# Patient Record
Sex: FEMALE | Race: WHITE | NOT HISPANIC OR LATINO
[De-identification: names, ages, dates, MRNs, and addresses within clinical notes are randomized per-mention and may not be internally consistent; named-entity substitution may affect disease eponyms.]

---

## 2017-03-06 ENCOUNTER — MEDICATION RENEWAL (OUTPATIENT)
Age: 62
End: 2017-03-06

## 2017-03-30 ENCOUNTER — APPOINTMENT (OUTPATIENT)
Dept: ENDOCRINOLOGY | Facility: CLINIC | Age: 62
End: 2017-03-30

## 2017-03-30 VITALS
WEIGHT: 99 LBS | HEIGHT: 58.5 IN | SYSTOLIC BLOOD PRESSURE: 120 MMHG | BODY MASS INDEX: 20.23 KG/M2 | HEART RATE: 90 BPM | DIASTOLIC BLOOD PRESSURE: 78 MMHG | OXYGEN SATURATION: 99 %

## 2017-03-30 RX ORDER — DENOSUMAB 60 MG/ML
60 INJECTION SUBCUTANEOUS
Qty: 1 | Refills: 0 | Status: COMPLETED | OUTPATIENT
Start: 2017-03-30

## 2017-03-30 RX ADMIN — DENOSUMAB 0 MG/ML: 60 INJECTION SUBCUTANEOUS at 00:00

## 2017-10-03 ENCOUNTER — APPOINTMENT (OUTPATIENT)
Dept: ENDOCRINOLOGY | Facility: CLINIC | Age: 62
End: 2017-10-03
Payer: COMMERCIAL

## 2017-10-03 VITALS
OXYGEN SATURATION: 98 % | SYSTOLIC BLOOD PRESSURE: 110 MMHG | HEART RATE: 80 BPM | HEIGHT: 58.5 IN | DIASTOLIC BLOOD PRESSURE: 60 MMHG | BODY MASS INDEX: 19.61 KG/M2 | WEIGHT: 96 LBS

## 2017-10-03 PROCEDURE — 99214 OFFICE O/P EST MOD 30 MIN: CPT | Mod: 25

## 2017-10-03 PROCEDURE — 96372 THER/PROPH/DIAG INJ SC/IM: CPT

## 2017-10-03 RX ORDER — DENOSUMAB 60 MG/ML
60 INJECTION SUBCUTANEOUS
Qty: 0 | Refills: 0 | Status: COMPLETED | OUTPATIENT
Start: 2017-10-03

## 2017-10-03 RX ADMIN — DENOSUMAB 0 MG/ML: 60 INJECTION SUBCUTANEOUS at 00:00

## 2018-02-26 ENCOUNTER — MEDICATION RENEWAL (OUTPATIENT)
Age: 63
End: 2018-02-26

## 2018-04-10 ENCOUNTER — APPOINTMENT (OUTPATIENT)
Dept: ENDOCRINOLOGY | Facility: CLINIC | Age: 63
End: 2018-04-10
Payer: COMMERCIAL

## 2018-04-10 VITALS
BODY MASS INDEX: 19.15 KG/M2 | WEIGHT: 95 LBS | DIASTOLIC BLOOD PRESSURE: 80 MMHG | HEART RATE: 70 BPM | OXYGEN SATURATION: 98 % | SYSTOLIC BLOOD PRESSURE: 120 MMHG | HEIGHT: 59 IN

## 2018-04-10 PROCEDURE — 96372 THER/PROPH/DIAG INJ SC/IM: CPT

## 2018-04-10 PROCEDURE — 99214 OFFICE O/P EST MOD 30 MIN: CPT | Mod: 25

## 2018-04-10 RX ORDER — DENOSUMAB 60 MG/ML
60 INJECTION SUBCUTANEOUS
Qty: 0 | Refills: 0 | Status: COMPLETED | OUTPATIENT
Start: 2018-04-10

## 2018-04-10 RX ADMIN — DENOSUMAB 0 MG/ML: 60 INJECTION SUBCUTANEOUS at 00:00

## 2018-04-11 LAB
ALBUMIN SERPL ELPH-MCNC: 3.9 G/DL
ALP BLD-CCNC: 37 U/L
ALT SERPL-CCNC: 10 U/L
ANION GAP SERPL CALC-SCNC: 14 MMOL/L
AST SERPL-CCNC: 14 U/L
BILIRUB SERPL-MCNC: 0.2 MG/DL
BUN SERPL-MCNC: 19 MG/DL
CALCIUM SERPL-MCNC: 9.9 MG/DL
CHLORIDE SERPL-SCNC: 102 MMOL/L
CO2 SERPL-SCNC: 26 MMOL/L
CREAT SERPL-MCNC: 0.85 MG/DL
GLUCOSE SERPL-MCNC: 84 MG/DL
POTASSIUM SERPL-SCNC: 4.8 MMOL/L
PROT SERPL-MCNC: 6.6 G/DL
SODIUM SERPL-SCNC: 142 MMOL/L

## 2018-10-11 ENCOUNTER — APPOINTMENT (OUTPATIENT)
Dept: ENDOCRINOLOGY | Facility: CLINIC | Age: 63
End: 2018-10-11
Payer: COMMERCIAL

## 2018-10-11 VITALS
HEART RATE: 82 BPM | HEIGHT: 58.5 IN | OXYGEN SATURATION: 96 % | SYSTOLIC BLOOD PRESSURE: 110 MMHG | DIASTOLIC BLOOD PRESSURE: 80 MMHG | BODY MASS INDEX: 19.61 KG/M2 | WEIGHT: 96 LBS

## 2018-10-11 VITALS — HEIGHT: 58.5 IN | BODY MASS INDEX: 19.61 KG/M2 | WEIGHT: 96 LBS

## 2018-10-11 PROCEDURE — 96372 THER/PROPH/DIAG INJ SC/IM: CPT

## 2018-10-11 PROCEDURE — ZZZZZ: CPT

## 2018-10-11 PROCEDURE — 77080 DXA BONE DENSITY AXIAL: CPT

## 2018-10-11 PROCEDURE — 99214 OFFICE O/P EST MOD 30 MIN: CPT | Mod: 25

## 2018-10-11 RX ORDER — DENOSUMAB 60 MG/ML
60 INJECTION SUBCUTANEOUS
Qty: 0 | Refills: 0 | Status: COMPLETED | OUTPATIENT
Start: 2018-10-11

## 2018-10-11 RX ADMIN — DENOSUMAB 0 MG/ML: 60 INJECTION SUBCUTANEOUS at 00:00

## 2018-10-25 ENCOUNTER — TRANSCRIPTION ENCOUNTER (OUTPATIENT)
Age: 63
End: 2018-10-25

## 2019-04-16 ENCOUNTER — APPOINTMENT (OUTPATIENT)
Dept: ENDOCRINOLOGY | Facility: CLINIC | Age: 64
End: 2019-04-16
Payer: COMMERCIAL

## 2019-04-16 VITALS
BODY MASS INDEX: 19.61 KG/M2 | HEIGHT: 58.5 IN | HEART RATE: 81 BPM | OXYGEN SATURATION: 98 % | SYSTOLIC BLOOD PRESSURE: 118 MMHG | DIASTOLIC BLOOD PRESSURE: 68 MMHG | WEIGHT: 96 LBS

## 2019-04-16 PROCEDURE — 99214 OFFICE O/P EST MOD 30 MIN: CPT | Mod: 25

## 2019-04-16 PROCEDURE — 96401 CHEMO ANTI-NEOPL SQ/IM: CPT

## 2019-04-16 NOTE — HISTORY OF PRESENT ILLNESS
[Risedronate (Actonel)] : Risedronate [Patient taking Meds Correctly] : Patient is taking meds correctly [Prolia (Denosumab)] : Prolia (Denosumab) [Family History of Osteoporosis] : family history of osteoporosis [Family History of Hip Fracture] : family history of hip fracture [Regular Dental Follow-Up] : regular dental follow-up [FreeTextEntry1] : 63 year-old female with osteoporosis. \par \par Told of low bone density for many years. She had been on Actonel for 3 years with sl myalgias. She stopped at that time of a horse-riding accident which caused multiple facial injuries. She has had no adult osteoporosis related fxs. Began Prolia Sept 2015, tolerating well. No interval fx. No thigh pain. BMD Sept 2016 improved spine although still low, spine -3.7. BMD 2018 further improvement in spine. Last DDS few mons ago. No ONJ.  No current back pain. Walks 4-6 miles per day. + lifts weights.  [Disordered Eating] : no past or present history of disordered eating [Taking Steroids] : no past or present history of taking steroids [Kidney Stones] : no history of kidney stones [High Fall Risk] : no fall risk [Frequent Falls] : no frequent falls [Uses a Walker/Cane] : no use of a walker/cane [Family History of Breast Cancer] : no family history of breast cancer [Hyperparathyroidism] : no hyperparathyroidism [History of Radiation Therapy] : no history of radiation therapy [History of Blood Clots] : no history of blood clots [Previous Fragility Fracture] : no previous fragility fracture

## 2019-04-16 NOTE — ASSESSMENT
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 \par \par Pt was previously treated with Actonel for low spinal density, until a horseback riding injury left her with multiple facial injuries. No h/o osteoporosis related fx but fhx of osteoporosis with hip fx (mother). Tolerating Prolia well since 2015. No thigh pain. No ONJ. No interval fx. Bone density improved spine and total hip 9/2016. Repeat BMD in 10/2018 indicated significant increase in spine and stable osteopenia in the hip and proximal radius. \par \par I discussed that pt can not stop Prolia without expecting rapid bone loss and increase in risk for future fx. Further, there is 10 year safety data for Prolia. Pt would have to transition to bisphosphate treatment to benefit from a future drug holiday. Continue Prolia from [facility]. \par \par I request labs sent out today. \par \par f/u in 6 mons.  [Denosumab Therapy] : Risks  and benefits of denosumab therapy were discussed with the patient including eczema, cellulitis, osteonecrosis of the jaw and atypical femur fractures

## 2019-04-16 NOTE — PHYSICAL EXAM
[Alert] : alert [Well Nourished] : well nourished [No Acute Distress] : no acute distress [EOMI] : extra ocular movement intact [Normal Sclera/Conjunctiva] : normal sclera/conjunctiva [Well Developed] : well developed [No Proptosis] : no proptosis [Normal Oropharynx] : the oropharynx was normal [Thyroid Not Enlarged] : the thyroid was not enlarged [No Thyroid Nodules] : there were no palpable thyroid nodules [No Respiratory Distress] : no respiratory distress [No Accessory Muscle Use] : no accessory muscle use [Clear to Auscultation] : lungs were clear to auscultation bilaterally [Normal Rate] : heart rate was normal  [Normal S1, S2] : normal S1 and S2 [Normal Bowel Sounds] : normal bowel sounds [Regular Rhythm] : with a regular rhythm [Not Tender] : non-tender [Soft] : abdomen soft [Not Distended] : not distended [Post Cervical Nodes] : posterior cervical nodes [Normal] : normal and non tender [Anterior Cervical Nodes] : anterior cervical nodes [No Stigmata of Cushings Syndrome] : no stigmata of cushings syndrome [Spine Straight] : spine straight [No Spinal Tenderness] : no spinal tenderness [Normal Strength/Tone] : muscle strength and tone were normal [Normal Gait] : normal gait [No Rash] : no rash [No Tremors] : no tremors [Oriented x3] : oriented to person, place, and time [Normal Reflexes] : deep tendon reflexes were 2+ and symmetric [Acanthosis Nigricans] : no acanthosis nigricans [de-identified] : tight paraspinals [de-identified] : tight paraspinal muscles

## 2019-04-16 NOTE — PROCEDURE
[FreeTextEntry1] : Bone mineral density: 10/11/2018 \par Indication: vs. 2016 \par Spine: -3.2 osteoporosis (+8.2%) total +12.5% since start of Prolia\par Total hip: -2.1 osteopenia, no significant change \par Femoral neck: -2.1 osteopenia, no significant change \par Proximal radius: -1.8 osteopenia, no significant change (+5.5% vs 2015)\par \par bone mineral density test September 27, 2016\par Indication prior test showed rapid bone loss\par Spine -3.7, osteoporosis, +4.1%\par Total hip -2.2, osteopenia, +4.7%\par Femoral neck -2.0, osteopenia, no significant change\par Proximal radius -2., osteopenia, No significant change\par \par bone mineral density test July 23, 2015\par Indication prior test showed rapid bone loss\par Spine -3.9, osteoporosis, decreased versus 2012\par Total hip -2.4, osteopenia, decreased versus prior study\par Femoral neck -2.2, osteopenia, no significant change\par Proximal radius -2.3, osteopenia, no prior\par \par bone mineral density test July 23, 2015\par Indication prior test showed rapid bone loss\par Spine -3.9, osteoporosis, decreased versus 2012\par Total hip -2.4, osteopenia, decreased versus prior study\par Femoral neck -2.2, osteopenia, no significant change\par Proximal radius -2.3, osteopenia, no prior\par \par Review of laboratory results calcium 10.3 parathyroid hormone 63 vitamin D 20

## 2019-04-16 NOTE — END OF VISIT
[FreeTextEntry3] : I, Ashlyn Lowe, authored this note working as a medical scribe for Dr. Das.  04/16/2019.  1:00PM. \par This note was authored by Ashlyn Lowe working as medical scribe for me. I have reviewed, edited, and revised the note as needed. I am in agreement with the exam findings, imaging findings, and treatment plan.  Chris Das MD

## 2019-04-17 LAB
25(OH)D3 SERPL-MCNC: 18.7 NG/ML
ALBUMIN SERPL ELPH-MCNC: 4.5 G/DL
ALP BLD-CCNC: 31 U/L
ALT SERPL-CCNC: 18 U/L
ANION GAP SERPL CALC-SCNC: 10 MMOL/L
AST SERPL-CCNC: 18 U/L
BILIRUB SERPL-MCNC: 0.2 MG/DL
BUN SERPL-MCNC: 11 MG/DL
CALCIUM SERPL-MCNC: 9.9 MG/DL
CALCIUM SERPL-MCNC: 9.9 MG/DL
CHLORIDE SERPL-SCNC: 104 MMOL/L
CO2 SERPL-SCNC: 27 MMOL/L
CREAT SERPL-MCNC: 0.83 MG/DL
GLUCOSE SERPL-MCNC: 86 MG/DL
PARATHYROID HORMONE INTACT: 52 PG/ML
POTASSIUM SERPL-SCNC: 4.1 MMOL/L
PROT SERPL-MCNC: 6.5 G/DL
SODIUM SERPL-SCNC: 141 MMOL/L

## 2019-06-03 ENCOUNTER — RX RENEWAL (OUTPATIENT)
Age: 64
End: 2019-06-03

## 2019-06-03 ENCOUNTER — TRANSCRIPTION ENCOUNTER (OUTPATIENT)
Age: 64
End: 2019-06-03

## 2019-10-23 ENCOUNTER — APPOINTMENT (OUTPATIENT)
Dept: ENDOCRINOLOGY | Facility: CLINIC | Age: 64
End: 2019-10-23
Payer: COMMERCIAL

## 2019-10-23 VITALS
OXYGEN SATURATION: 97 % | RESPIRATION RATE: 18 BRPM | HEART RATE: 72 BPM | DIASTOLIC BLOOD PRESSURE: 72 MMHG | SYSTOLIC BLOOD PRESSURE: 114 MMHG

## 2019-10-23 PROCEDURE — 96401 CHEMO ANTI-NEOPL SQ/IM: CPT

## 2019-10-23 RX ORDER — DENOSUMAB 60 MG/ML
60 INJECTION SUBCUTANEOUS
Qty: 1 | Refills: 0 | Status: COMPLETED | OUTPATIENT
Start: 2019-10-23

## 2019-10-23 RX ADMIN — DENOSUMAB 0 MG/ML: 60 INJECTION SUBCUTANEOUS at 00:00

## 2019-10-24 LAB
ALBUMIN SERPL ELPH-MCNC: 4.5 G/DL
ALP BLD-CCNC: 32 U/L
ALT SERPL-CCNC: 14 U/L
ANION GAP SERPL CALC-SCNC: 11 MMOL/L
AST SERPL-CCNC: 17 U/L
BILIRUB SERPL-MCNC: <0.2 MG/DL
BUN SERPL-MCNC: 13 MG/DL
CALCIUM SERPL-MCNC: 10 MG/DL
CHLORIDE SERPL-SCNC: 103 MMOL/L
CO2 SERPL-SCNC: 26 MMOL/L
CREAT SERPL-MCNC: 0.83 MG/DL
GLUCOSE SERPL-MCNC: 87 MG/DL
POTASSIUM SERPL-SCNC: 4.7 MMOL/L
PROT SERPL-MCNC: 6.4 G/DL
SODIUM SERPL-SCNC: 140 MMOL/L

## 2020-05-07 ENCOUNTER — APPOINTMENT (OUTPATIENT)
Dept: ENDOCRINOLOGY | Facility: CLINIC | Age: 65
End: 2020-05-07
Payer: COMMERCIAL

## 2020-05-07 VITALS
SYSTOLIC BLOOD PRESSURE: 110 MMHG | DIASTOLIC BLOOD PRESSURE: 70 MMHG | HEART RATE: 85 BPM | WEIGHT: 96 LBS | OXYGEN SATURATION: 98 % | HEIGHT: 58.5 IN | BODY MASS INDEX: 19.61 KG/M2

## 2020-05-07 VITALS — TEMPERATURE: 98.7 F

## 2020-05-07 PROCEDURE — 99212 OFFICE O/P EST SF 10 MIN: CPT | Mod: 25

## 2020-05-07 PROCEDURE — 96401 CHEMO ANTI-NEOPL SQ/IM: CPT

## 2020-05-07 RX ORDER — DENOSUMAB 60 MG/ML
60 INJECTION SUBCUTANEOUS
Qty: 1 | Refills: 0 | Status: COMPLETED | OUTPATIENT
Start: 2020-05-07

## 2020-05-07 RX ORDER — BIOTIN 10 MG
TABLET ORAL
Refills: 0 | Status: ACTIVE | COMMUNITY

## 2020-05-07 RX ADMIN — DENOSUMAB 60 MG/ML: 60 INJECTION SUBCUTANEOUS at 00:00

## 2020-05-08 NOTE — ASSESSMENT
[Denosumab Therapy] : Risks  and benefits of denosumab therapy were discussed with the patient including eczema, cellulitis, osteonecrosis of the jaw and atypical femur fractures [FreeTextEntry1] : 64 year-old female with postmenopausal osteoporosis. \par \par Pt was previously treated with Actonel for low spinal density, until a horseback riding injury left her with multiple facial injuries. No h/o osteoporosis related fx but fhx of osteoporosis with hip fx (mother). Tolerating Prolia well since 2015. No thigh pain. No ONJ. No interval fx. Bone density improved spine and total hip 9/2016. Repeat BMD in 10/2018 indicated significant increase in spine and stable osteopenia in the hip and proximal radius. \par \par I discussed that pt can not stop Prolia without expecting rapid bone loss and increase in risk for future fx. Further, there is 10 year safety data for Prolia. Pt would have to transition to bisphosphate treatment to benefit from a future drug holiday. Continue [facility] from [facility]\par \par f/u in 6 mons. repeat BMD next visit

## 2020-05-08 NOTE — HISTORY OF PRESENT ILLNESS
[Risedronate (Actonel)] : Risedronate [Patient taking Meds Correctly] : Patient is taking meds correctly [Prolia (Denosumab)] : Prolia (Denosumab) [Family History of Osteoporosis] : family history of osteoporosis [Family History of Hip Fracture] : family history of hip fracture [Regular Dental Follow-Up] : regular dental follow-up [FreeTextEntry1] :  \jenae Told of low bone density for many years. She had been on Actonel for 3 years with sl myalgias. She stopped at that time of a horse-riding accident which caused multiple facial injuries. She has had no adult osteoporosis related fxs. \jenae Began Prolia Sept 2015, tolerating well. No interval fx. No thigh pain. BMD Sept 2016 improved spine although still low, spine -3.7. BMD 2018 further improvement in spine. Last DDS few mons ago. No ONJ.  No current back pain. Walks 4-6 miles per day. + lifts weights.  [Taking Steroids] : no past or present history of taking steroids [Disordered Eating] : no past or present history of disordered eating [High Fall Risk] : no fall risk [Kidney Stones] : no history of kidney stones [Frequent Falls] : no frequent falls [Uses a Walker/Cane] : no use of a walker/cane [Family History of Breast Cancer] : no family history of breast cancer [History of Blood Clots] : no history of blood clots [Hyperparathyroidism] : no hyperparathyroidism [History of Radiation Therapy] : no history of radiation therapy [Previous Fragility Fracture] : no previous fragility fracture

## 2020-05-08 NOTE — PHYSICAL EXAM
[Alert] : alert [Well Nourished] : well nourished [No Acute Distress] : no acute distress [Normal Sclera/Conjunctiva] : normal sclera/conjunctiva [EOMI] : extra ocular movement intact [Well Developed] : well developed [Thyroid Not Enlarged] : the thyroid was not enlarged [Normal Oropharynx] : the oropharynx was normal [No Proptosis] : no proptosis [No Thyroid Nodules] : no palpable thyroid nodules [Clear to Auscultation] : lungs were clear to auscultation bilaterally [No Respiratory Distress] : no respiratory distress [No Accessory Muscle Use] : no accessory muscle use [Normal S1, S2] : normal S1 and S2 [Normal Rate] : heart rate was normal [Regular Rhythm] : with a regular rhythm [No Edema] : no peripheral edema [Normal Bowel Sounds] : normal bowel sounds [Soft] : abdomen soft [Not Distended] : not distended [Not Tender] : non-tender [Normal Posterior Cervical Nodes] : no posterior cervical lymphadenopathy [Normal Anterior Cervical Nodes] : no anterior cervical lymphadenopathy [No Stigmata of Cushings Syndrome] : no stigmata of Cushings Syndrome [No Spinal Tenderness] : no spinal tenderness [Spine Straight] : spine straight [Normal Strength/Tone] : muscle strength and tone were normal [Normal Gait] : normal gait [Acanthosis Nigricans] : no acanthosis nigricans [No Rash] : no rash [Normal Reflexes] : deep tendon reflexes were 2+ and symmetric [Oriented x3] : oriented to person, place, and time [No Tremors] : no tremors

## 2020-11-09 ENCOUNTER — APPOINTMENT (OUTPATIENT)
Dept: ENDOCRINOLOGY | Facility: CLINIC | Age: 65
End: 2020-11-09
Payer: COMMERCIAL

## 2020-11-09 VITALS
HEIGHT: 58.25 IN | OXYGEN SATURATION: 98 % | BODY MASS INDEX: 19.47 KG/M2 | HEART RATE: 76 BPM | WEIGHT: 94 LBS | DIASTOLIC BLOOD PRESSURE: 72 MMHG | TEMPERATURE: 98.4 F | SYSTOLIC BLOOD PRESSURE: 110 MMHG

## 2020-11-09 PROCEDURE — 99213 OFFICE O/P EST LOW 20 MIN: CPT | Mod: 25

## 2020-11-09 PROCEDURE — 77080 DXA BONE DENSITY AXIAL: CPT

## 2020-11-09 PROCEDURE — ZZZZZ: CPT

## 2020-11-09 PROCEDURE — 96401 CHEMO ANTI-NEOPL SQ/IM: CPT

## 2020-11-09 PROCEDURE — 99072 ADDL SUPL MATRL&STAF TM PHE: CPT

## 2020-11-09 RX ORDER — DENOSUMAB 60 MG/ML
60 INJECTION SUBCUTANEOUS
Qty: 1 | Refills: 0 | Status: COMPLETED | OUTPATIENT
Start: 2020-11-09

## 2020-11-09 RX ADMIN — DENOSUMAB 60 MG/ML: 60 INJECTION SUBCUTANEOUS at 00:00

## 2020-11-10 LAB
25(OH)D3 SERPL-MCNC: 54 NG/ML
ALBUMIN SERPL ELPH-MCNC: 4.7 G/DL
ALP BLD-CCNC: 34 U/L
ALT SERPL-CCNC: 19 U/L
ANION GAP SERPL CALC-SCNC: 15 MMOL/L
AST SERPL-CCNC: 19 U/L
BILIRUB SERPL-MCNC: 0.2 MG/DL
BUN SERPL-MCNC: 21 MG/DL
CALCIUM SERPL-MCNC: 9.9 MG/DL
CALCIUM SERPL-MCNC: 9.9 MG/DL
CHLORIDE SERPL-SCNC: 102 MMOL/L
CO2 SERPL-SCNC: 25 MMOL/L
CREAT SERPL-MCNC: 0.77 MG/DL
GLUCOSE SERPL-MCNC: 75 MG/DL
PARATHYROID HORMONE INTACT: 66 PG/ML
POTASSIUM SERPL-SCNC: 4.7 MMOL/L
PROT SERPL-MCNC: 6.9 G/DL
SODIUM SERPL-SCNC: 141 MMOL/L

## 2020-11-10 NOTE — PHYSICAL EXAM
[Alert] : alert [Well Nourished] : well nourished [No Acute Distress] : no acute distress [Well Developed] : well developed [Normal Sclera/Conjunctiva] : normal sclera/conjunctiva [EOMI] : extra ocular movement intact [No Proptosis] : no proptosis [Normal Oropharynx] : the oropharynx was normal [Thyroid Not Enlarged] : the thyroid was not enlarged [No Thyroid Nodules] : no palpable thyroid nodules [Clear to Auscultation] : lungs were clear to auscultation bilaterally [Normal S1, S2] : normal S1 and S2 [Normal Rate] : heart rate was normal [Regular Rhythm] : with a regular rhythm [No Edema] : no peripheral edema [Normal Bowel Sounds] : normal bowel sounds [Not Tender] : non-tender [Not Distended] : not distended [Soft] : abdomen soft [Normal Anterior Cervical Nodes] : no anterior cervical lymphadenopathy [No Spinal Tenderness] : no spinal tenderness [Spine Straight] : spine straight [No Stigmata of Cushings Syndrome] : no stigmata of Cushings Syndrome [Normal Gait] : normal gait [Normal Strength/Tone] : muscle strength and tone were normal [No Rash] : no rash [Normal Reflexes] : deep tendon reflexes were 2+ and symmetric [No Tremors] : no tremors [Oriented x3] : oriented to person, place, and time [Acanthosis Nigricans] : no acanthosis nigricans

## 2020-11-10 NOTE — PROCEDURE
[FreeTextEntry1] : Bone mineral density November 9, 2020\par indication: Compared to 2018\par spine -3.1 osteoporosis no significant change although +14.8% versus 2015\par total hip -1.9 osteopenia no significant change although +9.8% versus 2015\par femoral neck -2.0 osteopenia no significant change although +4.6% versus 2015\par proximal radius -1.9 osteopenia no significant change\par \par Bone mineral density: 11/9/2020\par vs 2018\par spine: -3.1 (+14.8% from baseline)\par Total hip: -1.9 (+9.8% from baseline)\par Femoral neck: -2.0 (no significant change)\par Proximal radius: -1.9 (no significant change)\par \par \par \par \par Bone mineral density: 10/11/2018 \par Indication: vs. 2016 \par Spine: -3.2 osteoporosis (+8.2%) total +12.5% since start of Prolia\par Total hip: -2.1 osteopenia, no significant change \par Femoral neck: -2.1 osteopenia, no significant change \par Proximal radius: -1.8 osteopenia, no significant change (+5.5% vs 2015)\par \par bone mineral density test September 27, 2016\par Indication prior test showed rapid bone loss\par Spine -3.7, osteoporosis, +4.1%\par Total hip -2.2, osteopenia, +4.7%\par Femoral neck -2.0, osteopenia, no significant change\par Proximal radius -2., osteopenia, No significant change\par \par bone mineral density test July 23, 2015\par Indication prior test showed rapid bone loss\par Spine -3.9, osteoporosis, decreased versus 2012\par Total hip -2.4, osteopenia, decreased versus prior study\par Femoral neck -2.2, osteopenia, no significant change\par Proximal radius -2.3, osteopenia, no prior\par \par bone mineral density test July 23, 2015\par Indication prior test showed rapid bone loss\par Spine -3.9, osteoporosis, decreased versus 2012\par Total hip -2.4, osteopenia, decreased versus prior study\par Femoral neck -2.2, osteopenia, no significant change\par Proximal radius -2.3, osteopenia, no prior\par \par Review of laboratory results calcium 10.3 parathyroid hormone 63 vitamin D 20

## 2020-11-10 NOTE — HISTORY OF PRESENT ILLNESS
[Risedronate (Actonel)] : Risedronate [Patient taking Meds Correctly] : Patient is taking meds correctly [Prolia (Denosumab)] : Prolia (Denosumab) [Family History of Osteoporosis] : family history of osteoporosis [Family History of Hip Fracture] : family history of hip fracture [Regular Dental Follow-Up] : regular dental follow-up [FreeTextEntry1] : Told of low bone density for many years. She had been on Actonel for 3 years with sl myalgias. She stopped at that time of a horse-riding accident which caused multiple facial injuries. She has had no adult osteoporosis related fxs. \par Began Prolia Sept 2015, tolerating well. No interval fx. No thigh pain. BMD Sept 2016 improved spine although still low, spine -3.7. BMD 2018 further improvement in spine, -3.2 (12.5% inc from baseline). BMD today 11/9/2020 stable BMD at spine, -3.1 (14.8% inc from baseline) with stable osteopenia at other sites. Last DDS few months ago. No ONJ. Denies jaw pain. Admits to mild aches and pains occasionally. Exercises regularly. Takes vitamin D 2,000 IU daily. Last vitamin D level low (18) in 2019. [Disordered Eating] : no past or present history of disordered eating [Taking Steroids] : no past or present history of taking steroids [Kidney Stones] : no history of kidney stones [High Fall Risk] : no fall risk [Frequent Falls] : no frequent falls [Uses a Walker/Cane] : no use of a walker/cane [Family History of Breast Cancer] : no family history of breast cancer [Hyperparathyroidism] : no hyperparathyroidism [History of Radiation Therapy] : no history of radiation therapy [History of Blood Clots] : no history of blood clots [Previous Fragility Fracture] : no previous fragility fracture

## 2020-11-10 NOTE — ASSESSMENT
[Denosumab Therapy] : Risks  and benefits of denosumab therapy were discussed with the patient including eczema, cellulitis, osteonecrosis of the jaw and atypical femur fractures [FreeTextEntry1] : 64 year-old female with postmenopausal osteoporosis. \par \par Pt was previously treated with Actonel for low spinal density, until a horseback riding injury left her with multiple facial injuries. No h/o osteoporosis related fx but fhx of osteoporosis with hip fx (mother). Tolerating Prolia well since 2015. No thigh pain. No ONJ. No interval fx. Since starting Prolia pt has had significant improvement in BMD in spine and total hip. BMD today 11/9/2020 shows stable OP of spine compared to previous and 14% increase from baseline and stable osteopenia in the hip and proximal radius. Continue vitamin D 2,000 IU daily. Will check labs today. \par \par I discussed that pt can not stop Prolia without expecting rapid bone loss and increase in risk for future fx. Further, there is 10 year safety data for Prolia. Pt would have to transition to bisphosphate treatment to benefit from a future drug holiday. Continue [facility] from [facility]\par Vitamin D insufficiency. Continue Vitamin D 1,000 units per day. \par f/u in 6 mons. repeat BMD in 2 yrs (11/2022)

## 2021-01-12 ENCOUNTER — TRANSCRIPTION ENCOUNTER (OUTPATIENT)
Age: 66
End: 2021-01-12

## 2021-02-08 ENCOUNTER — TRANSCRIPTION ENCOUNTER (OUTPATIENT)
Age: 66
End: 2021-02-08

## 2021-02-16 ENCOUNTER — RX RENEWAL (OUTPATIENT)
Age: 66
End: 2021-02-16

## 2021-02-16 RX ORDER — DENOSUMAB 60 MG/ML
60 INJECTION SUBCUTANEOUS
Qty: 1 | Refills: 1 | Status: ACTIVE | COMMUNITY
Start: 2019-04-16 | End: 1900-01-01

## 2021-05-10 ENCOUNTER — APPOINTMENT (OUTPATIENT)
Dept: ENDOCRINOLOGY | Facility: CLINIC | Age: 66
End: 2021-05-10
Payer: MEDICARE

## 2021-05-10 VITALS
DIASTOLIC BLOOD PRESSURE: 66 MMHG | BODY MASS INDEX: 19.88 KG/M2 | HEART RATE: 71 BPM | SYSTOLIC BLOOD PRESSURE: 122 MMHG | WEIGHT: 96 LBS | TEMPERATURE: 98.1 F | OXYGEN SATURATION: 98 % | HEIGHT: 58.25 IN

## 2021-05-10 PROCEDURE — 96372 THER/PROPH/DIAG INJ SC/IM: CPT

## 2021-05-10 PROCEDURE — 99214 OFFICE O/P EST MOD 30 MIN: CPT | Mod: 25

## 2021-05-10 RX ORDER — DENOSUMAB 60 MG/ML
60 INJECTION SUBCUTANEOUS
Qty: 1 | Refills: 0 | Status: COMPLETED | OUTPATIENT
Start: 2021-05-10

## 2021-05-10 RX ADMIN — DENOSUMAB 60 MG/ML: 60 INJECTION SUBCUTANEOUS at 00:00

## 2021-05-10 NOTE — HISTORY OF PRESENT ILLNESS
[Risedronate (Actonel)] : Risedronate [Patient taking Meds Correctly] : Patient is taking meds correctly [Prolia (Denosumab)] : Prolia (Denosumab) [Family History of Osteoporosis] : family history of osteoporosis [Family History of Hip Fracture] : family history of hip fracture [Regular Dental Follow-Up] : regular dental follow-up [FreeTextEntry1] : Told of low bone density for many years. She had been on Actonel for 3 years with sl myalgias. She stopped at that time of a horse-riding accident which caused multiple facial injuries. She has had no adult osteoporosis related fxs. \par Began Prolia Sept 2015, tolerating well. No interval fx. No thigh pain. BMD Sept 2016 improved spine although still low, spine -3.7. BMD 2018 further improvement in spine, -3.2 (12.5% inc from baseline). BMD today 11/9/2020 stable BMD at spine, -3.1 (14.8% inc from baseline) with stable osteopenia at other sites. Last DDS was one week ago. Patient had cleaning and scheduled for filling in 6/2020. No other dental procedures planned. Does not report jaw pain. Admits to mild aches and pains occasionally. Exercises regularly. Takes vitamin D 2,000 IU daily.11/2020 Ca 9.9, Albumin 4.7, Vit D 25 54, PTH 66 [Disordered Eating] : no past or present history of disordered eating [Taking Steroids] : no past or present history of taking steroids [Kidney Stones] : no history of kidney stones [High Fall Risk] : no fall risk [Frequent Falls] : no frequent falls [Uses a Walker/Cane] : no use of a walker/cane [Family History of Breast Cancer] : no family history of breast cancer [Hyperparathyroidism] : no hyperparathyroidism [History of Radiation Therapy] : no history of radiation therapy [History of Blood Clots] : no history of blood clots [Previous Fragility Fracture] : no previous fragility fracture

## 2021-05-10 NOTE — PROCEDURE
[FreeTextEntry1] : Bone mineral density: 11/9/2020\par vs 2018\par spine: -3.1 (+14.8% from baseline)\par Total hip: -1.9 (+9.8% from baseline)\par Femoral neck: -2.0 (no significant change)\par Proximal radius: -1.9 (no significant change)\par \par \par \par \par Bone mineral density: 10/11/2018 \par Indication: vs. 2016 \par Spine: -3.2 osteoporosis (+8.2%) total +12.5% since start of Prolia\par Total hip: -2.1 osteopenia, no significant change \par Femoral neck: -2.1 osteopenia, no significant change \par Proximal radius: -1.8 osteopenia, no significant change (+5.5% vs 2015)\par \par bone mineral density test September 27, 2016\par Indication prior test showed rapid bone loss\par Spine -3.7, osteoporosis, +4.1%\par Total hip -2.2, osteopenia, +4.7%\par Femoral neck -2.0, osteopenia, no significant change\par Proximal radius -2., osteopenia, No significant change\par \par bone mineral density test July 23, 2015\par Indication prior test showed rapid bone loss\par Spine -3.9, osteoporosis, decreased versus 2012\par Total hip -2.4, osteopenia, decreased versus prior study\par Femoral neck -2.2, osteopenia, no significant change\par Proximal radius -2.3, osteopenia, no prior\par \par bone mineral density test July 23, 2015\par Indication prior test showed rapid bone loss\par Spine -3.9, osteoporosis, decreased versus 2012\par Total hip -2.4, osteopenia, decreased versus prior study\par Femoral neck -2.2, osteopenia, no significant change\par Proximal radius -2.3, osteopenia, no prior\par \par Review of laboratory results calcium 10.3 parathyroid hormone 63 vitamin D 20

## 2021-05-10 NOTE — ASSESSMENT
[FreeTextEntry1] : 65 year-old female with postmenopausal osteoporosis. \par \par Pt was previously treated with Actonel for low spinal density, until a horseback riding injury left her with multiple facial injuries. No h/o osteoporosis related fx but fhx of osteoporosis with hip fx (mother). Tolerating Prolia well since 2015. No thigh pain. No ONJ. No interval fx. Since starting Prolia pt has had significant improvement in BMD in spine and total hip. BMD 11/9/2020 shows stable OP of spine compared to previous and 14% increase from baseline and stable osteopenia in the hip and proximal radius. Continue vitamin D 2,000 IU daily. Will obtain Labs from PCP, drawn in 1/2021\par \par I discussed that pt can not stop Prolia without expecting rapid bone loss and increase in risk for future fx. Further, there is 10 year safety data for Prolia. Pt would have to transition to bisphosphate treatment to benefit from a future drug holiday. Continue [facility] from [facility]\par Vitamin D insufficiency. Continue Vitamin D 2,000 units per day. \par f/u in 6 mons. repeat BMD in 2 yrs (11/2022)\par \par Seen and discussed with Dr Das \par \par Piero Burton MD\par Endocrine Fellow  [Denosumab Therapy] : Risks  and benefits of denosumab therapy were discussed with the patient including eczema, cellulitis, osteonecrosis of the jaw and atypical femur fractures

## 2021-11-23 ENCOUNTER — APPOINTMENT (OUTPATIENT)
Dept: ENDOCRINOLOGY | Facility: CLINIC | Age: 66
End: 2021-11-23
Payer: MEDICARE

## 2021-11-23 VITALS
DIASTOLIC BLOOD PRESSURE: 70 MMHG | BODY MASS INDEX: 19.94 KG/M2 | TEMPERATURE: 97.7 F | WEIGHT: 95 LBS | OXYGEN SATURATION: 99 % | HEART RATE: 74 BPM | HEIGHT: 58 IN | SYSTOLIC BLOOD PRESSURE: 118 MMHG

## 2021-11-23 PROCEDURE — 96372 THER/PROPH/DIAG INJ SC/IM: CPT

## 2021-11-23 PROCEDURE — 99214 OFFICE O/P EST MOD 30 MIN: CPT | Mod: 25

## 2021-11-23 RX ORDER — DENOSUMAB 60 MG/ML
60 INJECTION SUBCUTANEOUS
Qty: 1 | Refills: 0 | Status: COMPLETED | OUTPATIENT
Start: 2021-11-23

## 2021-11-23 RX ADMIN — DENOSUMAB 60 MG/ML: 60 INJECTION SUBCUTANEOUS at 00:00

## 2021-11-23 NOTE — HISTORY OF PRESENT ILLNESS
[Risedronate (Actonel)] : Risedronate [Vitamin D (oral)] : Vitamin D orally [Denosumab (Prolia)] : Denosumab [FreeTextEntry1] : No significant interval health changes. No interval surgery, hospitalizations, fractures, or change in medications.\par \par Told of low bone density for many years. She had been on Actonel for 3 years with sl myalgias. She stopped at that time of a horse-riding accident which caused multiple facial injuries. She has had no adult osteoporosis related fxs. Began Prolia Sept 2015. Tolerating well. No thigh pain, no interval fx. Normal Ca. Last DDS within past 6 months. No ONJ. Not planning major dental work. BMD 9/2016 improved spine although still low. BMD 2018 further improvement in spine. BMD 11/9/2020 stable BMD at spine and stable osteopenia at other sites. Exercises regularly. Takes Vitamin D 2,000 IU daily.

## 2021-11-23 NOTE — ASSESSMENT
[Denosumab Therapy] : Risks  and benefits of denosumab therapy were discussed with the patient including eczema, cellulitis, osteonecrosis of the jaw and atypical femur fractures [FreeTextEntry1] : 65 year-old female with postmenopausal osteoporosis. \par \par Pt was previously treated with Actonel for low spinal density, until a horseback riding injury left her with multiple facial injuries. No h/o osteoporosis related fx but fh/o osteoporosis with hip fx (mother). On Prolia since 2015. Tolerating well. No thigh pain, no interval fx. Normal Ca. No ONJ. BMD 9/2016 improved spine although still low. BMD 2018 further improvement in spine. BMD 11/9/2020 stable BMD at spine and stable osteopenia at other sites. Continue Prolia, buy and bill.\par \par Vitamin D insufficiency. Continue Vitamin D 2,000 units per day.\par \par F/u in 6 months

## 2021-11-23 NOTE — PROCEDURE
[FreeTextEntry1] : Bone mineral density: 11/9/2020\par vs 2018\par spine: -3.1 (+14.8% from baseline)\par Total hip: -1.9 (+9.8% from baseline)\par Femoral neck: -2.0 (no significant change)\par Proximal radius: -1.9 (no significant change)\par \par Bone mineral density: 10/11/2018 \par Indication: vs. 2016 \par Spine: -3.2 osteoporosis (+8.2%) total +12.5% since start of Prolia\par Total hip: -2.1 osteopenia, no significant change \par Femoral neck: -2.1 osteopenia, no significant change \par Proximal radius: -1.8 osteopenia, no significant change (+5.5% vs 2015)\par \par bone mineral density test September 27, 2016\par Indication prior test showed rapid bone loss\par Spine -3.7, osteoporosis, +4.1%\par Total hip -2.2, osteopenia, +4.7%\par Femoral neck -2.0, osteopenia, no significant change\par Proximal radius -2., osteopenia, No significant change\par \par bone mineral density test July 23, 2015\par Indication prior test showed rapid bone loss\par Spine -3.9, osteoporosis, decreased versus 2012\par Total hip -2.4, osteopenia, decreased versus prior study\par Femoral neck -2.2, osteopenia, no significant change\par Proximal radius -2.3, osteopenia, no prior\par \par bone mineral density test July 23, 2015\par Indication prior test showed rapid bone loss\par Spine -3.9, osteoporosis, decreased versus 2012\par Total hip -2.4, osteopenia, decreased versus prior study\par Femoral neck -2.2, osteopenia, no significant change\par Proximal radius -2.3, osteopenia, no prior\par \par Review of laboratory results calcium 10.3 parathyroid hormone 63 vitamin D 20

## 2021-11-23 NOTE — END OF VISIT
[FreeTextEntry3] : I, Juan Perera, authored this note working as a medical scribe for Dr. Das.  11/23/2021.  1:45PM. This note was authored by the medical scribe for me. I have reviewed, edited, and revised the note as needed. I am in agreement with the exam findings, imaging findings, and treatment plan.  Chris Das MD

## 2021-11-23 NOTE — PHYSICAL EXAM
[Alert] : alert [Well Nourished] : well nourished [No Acute Distress] : no acute distress [Well Developed] : well developed [Normal Sclera/Conjunctiva] : normal sclera/conjunctiva [EOMI] : extra ocular movement intact [No Proptosis] : no proptosis [Normal Hearing] : hearing was normal [Thyroid Not Enlarged] : the thyroid was not enlarged [No Thyroid Nodules] : no palpable thyroid nodules [Clear to Auscultation] : lungs were clear to auscultation bilaterally [Normal S1, S2] : normal S1 and S2 [Normal Rate] : heart rate was normal [Regular Rhythm] : with a regular rhythm [No Edema] : no peripheral edema [Not Tender] : non-tender [Not Distended] : not distended [Soft] : abdomen soft [Normal Anterior Cervical Nodes] : no anterior cervical lymphadenopathy [No Spinal Tenderness] : no spinal tenderness [Spine Straight] : spine straight [No Stigmata of Cushings Syndrome] : no stigmata of Cushings Syndrome [Normal Gait] : normal gait [Normal Reflexes] : deep tendon reflexes were 2+ and symmetric [No Tremors] : no tremors [Oriented x3] : oriented to person, place, and time [Normal Affect] : the affect was normal [Normal Mood] : the mood was normal

## 2021-11-24 LAB
25(OH)D3 SERPL-MCNC: 42.9 NG/ML
ALBUMIN SERPL ELPH-MCNC: 4.2 G/DL
ALP BLD-CCNC: 40 U/L
ALT SERPL-CCNC: 12 U/L
ANION GAP SERPL CALC-SCNC: 11 MMOL/L
AST SERPL-CCNC: 17 U/L
BILIRUB SERPL-MCNC: 0.2 MG/DL
BUN SERPL-MCNC: 15 MG/DL
CALCIUM SERPL-MCNC: 9.8 MG/DL
CALCIUM SERPL-MCNC: 9.8 MG/DL
CHLORIDE SERPL-SCNC: 104 MMOL/L
CO2 SERPL-SCNC: 25 MMOL/L
CREAT SERPL-MCNC: 0.79 MG/DL
GLUCOSE SERPL-MCNC: 75 MG/DL
PARATHYROID HORMONE INTACT: 34 PG/ML
PHOSPHATE SERPL-MCNC: 3.3 MG/DL
POTASSIUM SERPL-SCNC: 3.9 MMOL/L
PROT SERPL-MCNC: 6.5 G/DL
SODIUM SERPL-SCNC: 141 MMOL/L

## 2022-05-26 ENCOUNTER — APPOINTMENT (OUTPATIENT)
Dept: ENDOCRINOLOGY | Facility: CLINIC | Age: 67
End: 2022-05-26
Payer: MEDICARE

## 2022-05-26 VITALS
HEIGHT: 58 IN | HEART RATE: 91 BPM | OXYGEN SATURATION: 98 % | WEIGHT: 93 LBS | TEMPERATURE: 98.1 F | BODY MASS INDEX: 19.52 KG/M2 | DIASTOLIC BLOOD PRESSURE: 80 MMHG | SYSTOLIC BLOOD PRESSURE: 130 MMHG

## 2022-05-26 PROCEDURE — 96372 THER/PROPH/DIAG INJ SC/IM: CPT

## 2022-05-26 PROCEDURE — 99213 OFFICE O/P EST LOW 20 MIN: CPT | Mod: 25

## 2022-05-26 RX ORDER — DENOSUMAB 60 MG/ML
60 INJECTION SUBCUTANEOUS
Qty: 1 | Refills: 0 | Status: COMPLETED | OUTPATIENT
Start: 2022-05-26

## 2022-05-26 RX ADMIN — DENOSUMAB 60 MG/ML: 60 INJECTION SUBCUTANEOUS at 00:00

## 2022-05-27 NOTE — ASSESSMENT
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 \par \par Pt was previously treated with Actonel for low spinal density, until a horseback riding injury left her with multiple facial injuries. No h/o osteoporosis related fx but fh/o osteoporosis with hip fx (mother). On Prolia since 2015. Tolerating well. No thigh pain, no interval fx. Normal Ca. No ONJ. BMD 9/2016 improved spine although still low. BMD 2018 further improvement in spine. BMD 11/9/2020 stable BMD at spine and stable osteopenia at other sites. Continue Prolia, buy and bill.\par \par Vitamin D insufficiency. Continue Vitamin D 2,000 units per day.\par \par F/u in 6 months\par repeat BMD next visit

## 2022-05-27 NOTE — HISTORY OF PRESENT ILLNESS
[Risedronate (Actonel)] : Risedronate [Vitamin D (oral)] : Vitamin D orally [Denosumab (Prolia)] : Denosumab [FreeTextEntry1] : No significant interval health changes. No interval surgery, hospitalizations, fractures, or change in medications.\par Had a mild bout of COVID not requiring any medication or intervention\par \par Told of low bone density for many years. She had been on Actonel for 3 years with sl myalgias. She stopped at that time of a horse-riding accident which caused multiple facial injuries. She has had no adult osteoporosis related fxs. Began Prolia Sept 2015. Tolerating well. No thigh pain, no interval fx. Normal Ca. Last DDS within past 6 months. No ONJ. Not planning major dental work. BMD 9/2016 improved spine although still low. BMD 2018 further improvement in spine. BMD 11/9/2020 stable BMD at spine and stable osteopenia at other sites. Exercises regularly. Takes Vitamin D 2,000 IU daily.

## 2022-12-12 ENCOUNTER — APPOINTMENT (OUTPATIENT)
Dept: ENDOCRINOLOGY | Facility: CLINIC | Age: 67
End: 2022-12-12

## 2022-12-12 VITALS
DIASTOLIC BLOOD PRESSURE: 70 MMHG | HEART RATE: 68 BPM | OXYGEN SATURATION: 99 % | RESPIRATION RATE: 14 BRPM | HEIGHT: 58.25 IN | WEIGHT: 92 LBS | BODY MASS INDEX: 19.05 KG/M2 | SYSTOLIC BLOOD PRESSURE: 130 MMHG

## 2022-12-12 PROCEDURE — 77080 DXA BONE DENSITY AXIAL: CPT

## 2022-12-12 PROCEDURE — 96372 THER/PROPH/DIAG INJ SC/IM: CPT

## 2022-12-12 PROCEDURE — ZZZZZ: CPT

## 2022-12-12 PROCEDURE — 99213 OFFICE O/P EST LOW 20 MIN: CPT | Mod: 25

## 2022-12-12 RX ORDER — DENOSUMAB 60 MG/ML
60 INJECTION SUBCUTANEOUS
Qty: 1 | Refills: 0 | Status: COMPLETED | OUTPATIENT
Start: 2022-12-12

## 2022-12-12 RX ADMIN — DENOSUMAB 60 MG/ML: 60 INJECTION SUBCUTANEOUS at 00:00

## 2022-12-12 NOTE — ASSESSMENT
[Denosumab Therapy] : Risks  and benefits of denosumab therapy were discussed with the patient including eczema, cellulitis, osteonecrosis of the jaw and atypical femur fractures [FreeTextEntry1] : 67 year-old female with postmenopausal osteoporosis. \par \par Pt was previously treated with Actonel for low spinal density, until a horseback riding injury left her with multiple facial injuries. No h/o osteoporosis related fx but fh/o osteoporosis with hip fx (mother). On Prolia since 2015. Tolerating well. No thigh pain, no interval fx. Normal Ca. No ONJ. BMD 9/2016 improved spine although still low. BMD 2018 further improvement in spine. BMD 11/9/2020 stable BMD at spine and stable osteopenia at other sites. BMD 12/2022 shows that there has been an increase in bone density in the femoral neck. Continue Prolia, buy and bill.\par \par Vitamin D insufficiency. Continue Vitamin D 2,000 units per day.\par \par F/u in 6 months\par \par

## 2022-12-12 NOTE — HISTORY OF PRESENT ILLNESS
[Risedronate (Actonel)] : Risedronate [Vitamin D (oral)] : Vitamin D orally [Denosumab (Prolia)] : Denosumab [FreeTextEntry1] : Patient returns for a follow up visit for osteoporosis. Since the last visit pt has no significant interval health changes. No interval surgery, hospitalizations, fractures, or change in medications.\par Had a mild bout of COVID not requiring any medication or intervention\par \par Told of low bone density for many years. She had been on Actonel for 3 years with sl myalgias. She stopped at that time of a horse-riding accident which caused multiple facial injuries. She has had no adult osteoporosis related fxs. Began Prolia Sept 2015. Tolerating well. No thigh pain, no interval fx. Normal Ca. Last DDS within past 6 months. No ONJ. Not planning major dental work. BMD 9/2016 improved spine although still low. BMD 2018 further improvement in spine. BMD 11/9/2020 stable BMD at spine and stable osteopenia at other sites. Exercises regularly. Takes Vitamin D 2,000 IU daily.

## 2022-12-12 NOTE — END OF VISIT
[FreeTextEntry3] : This note was written by Mague Severino on ( December 12 , 2022) acting as a medical scribe for Dr. Das  This note was authored by the medical scribe for me. I have reviewed, edited, and revised the note as needed. I am in agreement with the exam findings, imaging findings, and treatment plan.  Chris Das MD

## 2022-12-12 NOTE — PROCEDURE
[FreeTextEntry1] : Bone Density 12/12/2022\par Indication vs 2020 Asses response o medication \par Spine -3.1 osteoporosis , no significant change \par Total Hip-1.8 osteopenia , no significant change \par Femoral Neck -1.7 osteopenia +4.2% \par Proximal Radius -2.1 osteopenia , no significant change \par \par Bone mineral density: 11/9/2020\par vs 2018\par spine: -3.1 (+14.8% from baseline)\par Total hip: -1.9 (+9.8% from baseline)\par Femoral neck: -2.0 (no significant change)\par Proximal radius: -1.9 (no significant change)\par \par Bone mineral density: 10/11/2018 \par Indication: vs. 2016 \par Spine: -3.2 osteoporosis (+8.2%) total +12.5% since start of Prolia\par Total hip: -2.1 osteopenia, no significant change \par Femoral neck: -2.1 osteopenia, no significant change \par Proximal radius: -1.8 osteopenia, no significant change (+5.5% vs 2015)\par \par bone mineral density test September 27, 2016\par Indication prior test showed rapid bone loss\par Spine -3.7, osteoporosis, +4.1%\par Total hip -2.2, osteopenia, +4.7%\par Femoral neck -2.0, osteopenia, no significant change\par Proximal radius -2., osteopenia, No significant change\par \par bone mineral density test July 23, 2015\par Indication prior test showed rapid bone loss\par Spine -3.9, osteoporosis, decreased versus 2012\par Total hip -2.4, osteopenia, decreased versus prior study\par Femoral neck -2.2, osteopenia, no significant change\par Proximal radius -2.3, osteopenia, no prior\par \par bone mineral density test July 23, 2015\par Indication prior test showed rapid bone loss\par Spine -3.9, osteoporosis, decreased versus 2012\par Total hip -2.4, osteopenia, decreased versus prior study\par Femoral neck -2.2, osteopenia, no significant change\par Proximal radius -2.3, osteopenia, no prior\par \par Review of laboratory results calcium 10.3 parathyroid hormone 63 vitamin D 20

## 2022-12-13 LAB
ALBUMIN SERPL ELPH-MCNC: 4.9 G/DL
ALP BLD-CCNC: 46 U/L
ALT SERPL-CCNC: 17 U/L
ANION GAP SERPL CALC-SCNC: 14 MMOL/L
AST SERPL-CCNC: 20 U/L
BILIRUB SERPL-MCNC: 0.2 MG/DL
BUN SERPL-MCNC: 26 MG/DL
CALCIUM SERPL-MCNC: 10.5 MG/DL
CHLORIDE SERPL-SCNC: 103 MMOL/L
CO2 SERPL-SCNC: 25 MMOL/L
CREAT SERPL-MCNC: 0.82 MG/DL
EGFR: 78 ML/MIN/1.73M2
GLUCOSE SERPL-MCNC: 81 MG/DL
POTASSIUM SERPL-SCNC: 4.7 MMOL/L
PROT SERPL-MCNC: 7.5 G/DL
SODIUM SERPL-SCNC: 142 MMOL/L

## 2023-06-14 ENCOUNTER — APPOINTMENT (OUTPATIENT)
Dept: ENDOCRINOLOGY | Facility: CLINIC | Age: 68
End: 2023-06-14
Payer: MEDICARE

## 2023-06-14 PROCEDURE — 96372 THER/PROPH/DIAG INJ SC/IM: CPT

## 2023-06-14 RX ORDER — DENOSUMAB 60 MG/ML
60 INJECTION SUBCUTANEOUS
Qty: 1 | Refills: 0 | Status: COMPLETED | OUTPATIENT
Start: 2023-06-14

## 2023-06-14 RX ADMIN — DENOSUMAB 60 MG/ML: 60 INJECTION SUBCUTANEOUS at 00:00

## 2023-12-18 ENCOUNTER — APPOINTMENT (OUTPATIENT)
Dept: ENDOCRINOLOGY | Facility: CLINIC | Age: 68
End: 2023-12-18
Payer: MEDICARE

## 2023-12-18 VITALS
OXYGEN SATURATION: 98 % | HEART RATE: 75 BPM | WEIGHT: 96 LBS | DIASTOLIC BLOOD PRESSURE: 80 MMHG | HEIGHT: 58 IN | BODY MASS INDEX: 20.15 KG/M2 | SYSTOLIC BLOOD PRESSURE: 120 MMHG

## 2023-12-18 DIAGNOSIS — E55.9 VITAMIN D DEFICIENCY, UNSPECIFIED: ICD-10-CM

## 2023-12-18 PROCEDURE — 96372 THER/PROPH/DIAG INJ SC/IM: CPT

## 2023-12-18 PROCEDURE — 99214 OFFICE O/P EST MOD 30 MIN: CPT | Mod: 25

## 2023-12-18 RX ORDER — DENOSUMAB 60 MG/ML
60 INJECTION SUBCUTANEOUS
Qty: 1 | Refills: 0 | Status: COMPLETED | OUTPATIENT
Start: 2023-12-18

## 2023-12-18 RX ADMIN — DENOSUMAB 60 MG/ML: 60 INJECTION SUBCUTANEOUS at 00:00

## 2023-12-19 PROBLEM — E55.9 VITAMIN D INSUFFICIENCY: Status: ACTIVE | Noted: 2020-11-09

## 2023-12-19 LAB
25(OH)D3 SERPL-MCNC: 38 NG/ML
ALBUMIN SERPL ELPH-MCNC: 4.4 G/DL
ALP BLD-CCNC: 41 U/L
ALT SERPL-CCNC: 16 U/L
ANION GAP SERPL CALC-SCNC: 10 MMOL/L
AST SERPL-CCNC: 17 U/L
BILIRUB SERPL-MCNC: 0.3 MG/DL
BUN SERPL-MCNC: 10 MG/DL
CALCIUM SERPL-MCNC: 9.4 MG/DL
CALCIUM SERPL-MCNC: 9.4 MG/DL
CHLORIDE SERPL-SCNC: 104 MMOL/L
CHOLEST SERPL-MCNC: 255 MG/DL
CO2 SERPL-SCNC: 26 MMOL/L
CREAT SERPL-MCNC: 0.86 MG/DL
EGFR: 74 ML/MIN/1.73M2
ESTIMATED AVERAGE GLUCOSE: 114 MG/DL
GLUCOSE SERPL-MCNC: 72 MG/DL
HBA1C MFR BLD HPLC: 5.6 %
HCT VFR BLD CALC: 39.3 %
HDLC SERPL-MCNC: 85 MG/DL
HGB BLD-MCNC: 12.4 G/DL
LDLC SERPL CALC-MCNC: 152 MG/DL
MCHC RBC-ENTMCNC: 30.1 PG
MCHC RBC-ENTMCNC: 31.6 GM/DL
MCV RBC AUTO: 95.4 FL
NONHDLC SERPL-MCNC: 170 MG/DL
PARATHYROID HORMONE INTACT: 60 PG/ML
PHOSPHATE SERPL-MCNC: 2.8 MG/DL
PLATELET # BLD AUTO: 282 K/UL
POTASSIUM SERPL-SCNC: 4.4 MMOL/L
PROT SERPL-MCNC: 6.5 G/DL
RBC # BLD: 4.12 M/UL
RBC # FLD: 13.3 %
SODIUM SERPL-SCNC: 139 MMOL/L
TRIGL SERPL-MCNC: 106 MG/DL
TSH SERPL-ACNC: 2.15 UIU/ML
WBC # FLD AUTO: 6.81 K/UL

## 2023-12-19 NOTE — HISTORY OF PRESENT ILLNESS
[Risedronate (Actonel)] : Risedronate [Vitamin D (oral)] : Vitamin D orally [Denosumab (Prolia)] : Denosumab [FreeTextEntry1] : Ms. Thompson is 68 years old female who returns for a follow up visit for osteoporosis.  Since the last visit pt has no significant interval health changes. No interval surgery, hospitalizations, falls, fractures, or change in medications.  Told of low bone density for many years. She had been on Actonel for 3 years with sl myalgias. She stopped at that time of a horse-riding accident which caused multiple facial injuries. She has had no adult osteoporosis related fxs. Began Prolia Sept 2015. Tolerating well. No thigh pain, no interval fx. Normal Ca. Last DDS within past 6 months. No ONJ. Not planning major dental work. BMD 9/2016 improved spine although still low. BMD 2018 further improvement in spine. BMD 11/9/2020 stable BMD at spine and stable osteopenia at other sites. Exercises regularly. Takes Vitamin D 2,000 IU daily.

## 2023-12-19 NOTE — ASSESSMENT
[Denosumab Therapy] : Risks  and benefits of denosumab therapy were discussed with the patient including eczema, cellulitis, osteonecrosis of the jaw and atypical femur fractures [FreeTextEntry1] : 68 year-old female with postmenopausal osteoporosis.  Pt was previously treated with Actonel for low spinal density, until a horseback riding injury left her with multiple facial injuries. No h/o osteoporosis related fx but fh/o osteoporosis with hip fx (mother). On Prolia since 2015. Tolerating well. No thigh pain, no interval fx. Normal Ca. No ONJ. BMD 9/2016 improved spine although still low. BMD 2018 further improvement in spine. BMD 11/9/2020 stable BMD at spine and stable osteopenia at other sites. BMD 12/2022 shows that there has been an increase in bone density in the femoral neck. Continue Prolia, buy and bill.  Vitamin D insufficiency. Continue Vitamin D 2,000 units per day.  F/u in 6 months

## 2023-12-19 NOTE — PROCEDURE
[FreeTextEntry1] : Bone Density 12/12/2022 Indication vs 2020 Asses response o medication  Spine -3.1 osteoporosis, no significant change  Total Hip-1.8 osteopenia, no significant change  Femoral Neck -1.7, osteopenia, +4.2%  Proximal Radius -2.1, osteopenia, no significant change   Bone mineral density: 11/9/2020 vs 2018 spine: -3.1 (+14.8% from baseline) Total hip: -1.9 (+9.8% from baseline) Femoral neck: -2.0 (no significant change) Proximal radius: -1.9 (no significant change)  Bone mineral density: 10/11/2018  Indication: vs. 2016  Spine: -3.2 osteoporosis (+8.2%) total +12.5% since start of Prolia Total hip: -2.1 osteopenia, no significant change  Femoral neck: -2.1 osteopenia, no significant change  Proximal radius: -1.8 osteopenia, no significant change (+5.5% vs 2015)  bone mineral density test September 27, 2016 Indication prior test showed rapid bone loss Spine -3.7, osteoporosis, +4.1% Total hip -2.2, osteopenia, +4.7% Femoral neck -2.0, osteopenia, no significant change Proximal radius -2., osteopenia, No significant change  bone mineral density test July 23, 2015 Indication prior test showed rapid bone loss Spine -3.9, osteoporosis, decreased versus 2012 Total hip -2.4, osteopenia, decreased versus prior study Femoral neck -2.2, osteopenia, no significant change Proximal radius -2.3, osteopenia, no prior  bone mineral density test July 23, 2015 Indication prior test showed rapid bone loss Spine -3.9, osteoporosis, decreased versus 2012 Total hip -2.4, osteopenia, decreased versus prior study Femoral neck -2.2, osteopenia, no significant change Proximal radius -2.3, osteopenia, no prior  Review of laboratory results calcium 10.3 parathyroid hormone 63 vitamin D 20

## 2023-12-19 NOTE — PAST MEDICAL HISTORY
[Menopause Age____] : age at menopause was [unfilled] [Postmenopausal] : The patient is postmenopausal [History of Hormone Replacement Treatment] : has no history of hormone replacement treatment

## 2024-06-19 ENCOUNTER — APPOINTMENT (OUTPATIENT)
Dept: ENDOCRINOLOGY | Facility: CLINIC | Age: 69
End: 2024-06-19
Payer: MEDICARE

## 2024-06-19 DIAGNOSIS — M81.0 AGE-RELATED OSTEOPOROSIS W/OUT CURRENT PATHOLOGICAL FRACTURE: ICD-10-CM

## 2024-06-19 PROCEDURE — 96372 THER/PROPH/DIAG INJ SC/IM: CPT

## 2024-06-19 RX ORDER — DENOSUMAB 60 MG/ML
60 INJECTION SUBCUTANEOUS
Qty: 1 | Refills: 0 | Status: COMPLETED | OUTPATIENT
Start: 2024-06-18

## 2024-12-31 ENCOUNTER — APPOINTMENT (OUTPATIENT)
Dept: ENDOCRINOLOGY | Facility: CLINIC | Age: 69
End: 2024-12-31
Payer: MEDICARE

## 2024-12-31 VITALS
HEART RATE: 81 BPM | WEIGHT: 91 LBS | BODY MASS INDEX: 19.1 KG/M2 | OXYGEN SATURATION: 98 % | DIASTOLIC BLOOD PRESSURE: 72 MMHG | HEIGHT: 58 IN | SYSTOLIC BLOOD PRESSURE: 112 MMHG

## 2024-12-31 VITALS — HEIGHT: 58 IN | BODY MASS INDEX: 19.1 KG/M2 | WEIGHT: 91 LBS

## 2024-12-31 DIAGNOSIS — E55.9 VITAMIN D DEFICIENCY, UNSPECIFIED: ICD-10-CM

## 2024-12-31 DIAGNOSIS — M81.0 AGE-RELATED OSTEOPOROSIS W/OUT CURRENT PATHOLOGICAL FRACTURE: ICD-10-CM

## 2024-12-31 PROCEDURE — 77080 DXA BONE DENSITY AXIAL: CPT | Mod: GA

## 2024-12-31 PROCEDURE — 99214 OFFICE O/P EST MOD 30 MIN: CPT | Mod: 25

## 2024-12-31 PROCEDURE — 96372 THER/PROPH/DIAG INJ SC/IM: CPT

## 2024-12-31 RX ORDER — DENOSUMAB 60 MG/ML
60 INJECTION SUBCUTANEOUS
Qty: 1 | Refills: 0 | Status: COMPLETED | OUTPATIENT
Start: 2024-12-31

## 2024-12-31 RX ADMIN — DENOSUMAB 60 MG/ML: 60 INJECTION SUBCUTANEOUS at 00:00

## 2025-04-09 ENCOUNTER — APPOINTMENT (OUTPATIENT)
Dept: ORTHOPEDIC SURGERY | Facility: CLINIC | Age: 70
End: 2025-04-09
Payer: MEDICARE

## 2025-04-09 DIAGNOSIS — S99.191A OTHER PHYSEAL FRACTURE OF RIGHT METATARSAL, INITIAL ENCOUNTER FOR CLOSED FRACTURE: ICD-10-CM

## 2025-04-09 PROCEDURE — 99204 OFFICE O/P NEW MOD 45 MIN: CPT | Mod: 57

## 2025-04-09 PROCEDURE — 29405 APPL SHORT LEG CAST: CPT | Mod: RT

## 2025-04-09 PROCEDURE — 28470 CLTX METATARSAL FX WO MNP EA: CPT | Mod: RT

## 2025-04-09 RX ORDER — ASPIRIN 81 MG/1
81 TABLET, CHEWABLE ORAL
Qty: 60 | Refills: 1 | Status: ACTIVE | COMMUNITY
Start: 2025-04-09 | End: 1900-01-01

## 2025-04-28 ENCOUNTER — APPOINTMENT (OUTPATIENT)
Dept: ORTHOPEDIC SURGERY | Facility: CLINIC | Age: 70
End: 2025-04-28
Payer: MEDICARE

## 2025-04-28 VITALS — WEIGHT: 91 LBS | BODY MASS INDEX: 19.1 KG/M2 | HEIGHT: 58 IN

## 2025-04-28 DIAGNOSIS — Z00.00 ENCOUNTER FOR GENERAL ADULT MEDICAL EXAMINATION W/OUT ABNORMAL FINDINGS: ICD-10-CM

## 2025-04-28 DIAGNOSIS — S99.191D OTHER PHYSEAL FRACTURE OF RIGHT METATARSAL, SUBSEQUENT ENCOUNTER FOR FRACTURE WITH ROUTINE HEALING: ICD-10-CM

## 2025-04-28 PROCEDURE — 73630 X-RAY EXAM OF FOOT: CPT | Mod: RT

## 2025-04-28 PROCEDURE — 99024 POSTOP FOLLOW-UP VISIT: CPT

## 2025-05-19 ENCOUNTER — APPOINTMENT (OUTPATIENT)
Dept: ORTHOPEDIC SURGERY | Facility: CLINIC | Age: 70
End: 2025-05-19
Payer: MEDICARE

## 2025-05-19 DIAGNOSIS — S99.191D OTHER PHYSEAL FRACTURE OF RIGHT METATARSAL, SUBSEQUENT ENCOUNTER FOR FRACTURE WITH ROUTINE HEALING: ICD-10-CM

## 2025-05-19 PROCEDURE — 99024 POSTOP FOLLOW-UP VISIT: CPT

## 2025-05-19 PROCEDURE — L4361: CPT | Mod: KX,RT

## 2025-05-19 PROCEDURE — 73630 X-RAY EXAM OF FOOT: CPT | Mod: RT

## 2025-06-09 ENCOUNTER — APPOINTMENT (OUTPATIENT)
Dept: ORTHOPEDIC SURGERY | Facility: CLINIC | Age: 70
End: 2025-06-09
Payer: MEDICARE

## 2025-06-09 PROCEDURE — 99024 POSTOP FOLLOW-UP VISIT: CPT

## 2025-06-09 PROCEDURE — 73630 X-RAY EXAM OF FOOT: CPT | Mod: RT

## 2025-07-07 ENCOUNTER — APPOINTMENT (OUTPATIENT)
Dept: ORTHOPEDIC SURGERY | Facility: CLINIC | Age: 70
End: 2025-07-07
Payer: MEDICARE

## 2025-07-07 PROCEDURE — 99024 POSTOP FOLLOW-UP VISIT: CPT

## 2025-07-07 PROCEDURE — 73630 X-RAY EXAM OF FOOT: CPT | Mod: RT

## 2025-07-08 ENCOUNTER — APPOINTMENT (OUTPATIENT)
Dept: ENDOCRINOLOGY | Facility: CLINIC | Age: 70
End: 2025-07-08
Payer: MEDICARE

## 2025-07-08 PROCEDURE — 96372 THER/PROPH/DIAG INJ SC/IM: CPT

## 2025-07-08 RX ORDER — DENOSUMAB 60 MG/ML
60 INJECTION SUBCUTANEOUS
Qty: 1 | Refills: 0 | Status: COMPLETED | OUTPATIENT
Start: 2025-07-03

## 2025-08-04 ENCOUNTER — APPOINTMENT (OUTPATIENT)
Dept: ORTHOPEDIC SURGERY | Facility: CLINIC | Age: 70
End: 2025-08-04
Payer: MEDICARE

## 2025-08-04 DIAGNOSIS — S99.191D OTHER PHYSEAL FRACTURE OF RIGHT METATARSAL, SUBSEQUENT ENCOUNTER FOR FRACTURE WITH ROUTINE HEALING: ICD-10-CM

## 2025-08-04 PROCEDURE — 99213 OFFICE O/P EST LOW 20 MIN: CPT

## 2025-08-04 PROCEDURE — 73630 X-RAY EXAM OF FOOT: CPT | Mod: RT
